# Patient Record
Sex: MALE | Race: WHITE | NOT HISPANIC OR LATINO | Employment: FULL TIME | URBAN - METROPOLITAN AREA
[De-identification: names, ages, dates, MRNs, and addresses within clinical notes are randomized per-mention and may not be internally consistent; named-entity substitution may affect disease eponyms.]

---

## 2017-03-01 ENCOUNTER — ALLSCRIPTS OFFICE VISIT (OUTPATIENT)
Dept: OTHER | Facility: OTHER | Age: 57
End: 2017-03-01

## 2017-03-01 DIAGNOSIS — D69.6 THROMBOCYTOPENIA (HCC): ICD-10-CM

## 2017-03-01 DIAGNOSIS — F17.200 NICOTINE DEPENDENCE, UNCOMPLICATED: ICD-10-CM

## 2017-03-01 DIAGNOSIS — I10 ESSENTIAL (PRIMARY) HYPERTENSION: ICD-10-CM

## 2017-03-01 DIAGNOSIS — K21.9 GASTRO-ESOPHAGEAL REFLUX DISEASE WITHOUT ESOPHAGITIS: ICD-10-CM

## 2017-05-24 ENCOUNTER — ALLSCRIPTS OFFICE VISIT (OUTPATIENT)
Dept: OTHER | Facility: OTHER | Age: 57
End: 2017-05-24

## 2017-07-19 ENCOUNTER — ALLSCRIPTS OFFICE VISIT (OUTPATIENT)
Dept: OTHER | Facility: OTHER | Age: 57
End: 2017-07-19

## 2017-07-19 ENCOUNTER — APPOINTMENT (OUTPATIENT)
Dept: LAB | Facility: HOSPITAL | Age: 57
End: 2017-07-19
Attending: INTERNAL MEDICINE
Payer: COMMERCIAL

## 2017-07-19 DIAGNOSIS — E55.9 VITAMIN D DEFICIENCY: ICD-10-CM

## 2017-07-19 DIAGNOSIS — I10 ESSENTIAL (PRIMARY) HYPERTENSION: ICD-10-CM

## 2017-07-19 DIAGNOSIS — E87.5 HYPERKALEMIA: ICD-10-CM

## 2017-07-19 DIAGNOSIS — K21.9 GASTRO-ESOPHAGEAL REFLUX DISEASE WITHOUT ESOPHAGITIS: ICD-10-CM

## 2017-07-19 DIAGNOSIS — D69.6 THROMBOCYTOPENIA (HCC): ICD-10-CM

## 2017-07-19 LAB
ANION GAP SERPL CALCULATED.3IONS-SCNC: 12 MMOL/L (ref 4–13)
BUN SERPL-MCNC: 28 MG/DL (ref 5–25)
CALCIUM SERPL-MCNC: 9.7 MG/DL (ref 8.3–10.1)
CHLORIDE SERPL-SCNC: 97 MMOL/L (ref 100–108)
CO2 SERPL-SCNC: 23 MMOL/L (ref 21–32)
CREAT SERPL-MCNC: 1.51 MG/DL (ref 0.6–1.3)
GFR SERPL CREATININE-BSD FRML MDRD: 48 ML/MIN/1.73SQ M
GLUCOSE SERPL-MCNC: 102 MG/DL (ref 65–140)
POTASSIUM SERPL-SCNC: 5.5 MMOL/L (ref 3.5–5.3)
SODIUM SERPL-SCNC: 132 MMOL/L (ref 136–145)

## 2017-07-19 PROCEDURE — 80048 BASIC METABOLIC PNL TOTAL CA: CPT

## 2017-07-19 PROCEDURE — 36415 COLL VENOUS BLD VENIPUNCTURE: CPT

## 2017-08-22 ENCOUNTER — APPOINTMENT (OUTPATIENT)
Dept: LAB | Facility: HOSPITAL | Age: 57
End: 2017-08-22
Payer: COMMERCIAL

## 2017-08-22 DIAGNOSIS — E87.5 HYPERKALEMIA: ICD-10-CM

## 2017-08-22 LAB
ANION GAP SERPL CALCULATED.3IONS-SCNC: 10 MMOL/L (ref 4–13)
BUN SERPL-MCNC: 13 MG/DL (ref 5–25)
CALCIUM SERPL-MCNC: 9.5 MG/DL (ref 8.3–10.1)
CHLORIDE SERPL-SCNC: 105 MMOL/L (ref 100–108)
CO2 SERPL-SCNC: 24 MMOL/L (ref 21–32)
CREAT SERPL-MCNC: 0.87 MG/DL (ref 0.6–1.3)
GFR SERPL CREATININE-BSD FRML MDRD: 96 ML/MIN/1.73SQ M
GLUCOSE P FAST SERPL-MCNC: 101 MG/DL (ref 65–99)
MAGNESIUM SERPL-MCNC: 2.4 MG/DL (ref 1.6–2.6)
POTASSIUM SERPL-SCNC: 4.8 MMOL/L (ref 3.5–5.3)
SODIUM SERPL-SCNC: 139 MMOL/L (ref 136–145)

## 2017-08-22 PROCEDURE — 83735 ASSAY OF MAGNESIUM: CPT

## 2017-08-22 PROCEDURE — 36415 COLL VENOUS BLD VENIPUNCTURE: CPT

## 2017-08-22 PROCEDURE — 80048 BASIC METABOLIC PNL TOTAL CA: CPT

## 2017-09-18 ENCOUNTER — ALLSCRIPTS OFFICE VISIT (OUTPATIENT)
Dept: OTHER | Facility: OTHER | Age: 57
End: 2017-09-18

## 2017-09-18 DIAGNOSIS — E55.9 VITAMIN D DEFICIENCY: ICD-10-CM

## 2017-09-18 DIAGNOSIS — K21.9 GASTRO-ESOPHAGEAL REFLUX DISEASE WITHOUT ESOPHAGITIS: ICD-10-CM

## 2017-09-18 DIAGNOSIS — I10 ESSENTIAL (PRIMARY) HYPERTENSION: ICD-10-CM

## 2017-09-18 DIAGNOSIS — R91.1 SOLITARY PULMONARY NODULE: ICD-10-CM

## 2017-12-18 ENCOUNTER — ALLSCRIPTS OFFICE VISIT (OUTPATIENT)
Dept: OTHER | Facility: OTHER | Age: 57
End: 2017-12-18

## 2017-12-19 NOTE — PROGRESS NOTES
Assessment  1  Benign essential HTN (401 1) (I10)   2  Vitamin D deficiency (268 9) (E55 9)   3  Thrombocytopenia (287 5) (D69 6)    Plan  Benign essential HTN, Thrombocytopenia    · Follow-up visit in 3 months Evaluation and Treatment  Follow-up  Status: Hold For - Scheduling Requested for: 67GJL6741  PMH: Screen for colon cancer    · COLONOSCOPY; Status:Active; Requested for:80Rwn7260;   SocHx: Current every day smoker, Pulmonary nodule    · Complete PFT; Status:Active; Requested for:80Mbw8878;   Vitamin D deficiency    · Vitamin D (Ergocalciferol) 20101 UNIT Oral Capsule    Discussion/Summary  Discussion Summary:   See med list again advise for blood test    Counseling Documentation With Imm: The patient was counseled regarding instructions for management,-- risk factor reductions,-- prognosis,-- impressions  Chief Complaint  Chief Complaint Free Text Note Form: pt  presents for follow up for HTN  pt  did not have BW done  History of Present Illness  Hypertension (Follow-Up): The patient presents for follow-up of essential hypertension  The patient states he has been doing poorly with his blood pressure control since the last visit  Comorbid Illnesses: nephropathy  Symptoms: The patient is currently asymptomatic  Associated symptoms include no focal neurologic deficits-- and-- no memory loss  Home monitoring: The patient is not checking blood pressure at home  Blood pressure control has been poor  Medications: the patient is not adherent with his medication regimen  -- He denies medication side effects  The patient is due for a lipid panel-- and-- a serum creatinine  Review of Systems  Complete-Male:  Constitutional: No fever or chills, feels well, no tiredness, no recent weight gain or weight loss  Eyes: No complaints of eye pain, no red eyes, no discharge from eyes, no itchy eyes    ENT: no complaints of earache, no hearing loss, no nosebleeds, no nasal discharge, no sore throat, no hoarseness  Cardiovascular: No complaints of slow heart rate, no fast heart rate, no chest pain, no palpitations, no leg claudication, no lower extremity  Respiratory: cough  Gastrointestinal: No complaints of abdominal pain, no constipation, no nausea or vomiting, no diarrhea or bloody stools  Musculoskeletal: joint stiffness, but-- no arthralgias  Neurological: No compliants of headache, no confusion, no convulsions, no numbness or tingling, no dizziness or fainting, no limb weakness, no difficulty walking  Psychiatric: anxiety, but-- not suicidal-- and-- no sleep disturbances  Endocrine: No complaints of proptosis, no hot flashes, no muscle weakness, no erectile dysfunction, no deepening of the voice, no feelings of weakness  Hematologic/Lymphatic: No complaints of swollen glands, no swollen glands in the neck, does not bleed easily, no easy bruising  Active Problems  1  Benign essential HTN (401 1) (I10)   2  Current every day smoker (305 1) (F17 200)   3  Gastroesophageal reflux disease (530 81) (K21 9)   4  Hyperkalemia (276 7) (E87 5)   5  Iliotibial band syndrome of left side (728 89) (M76 32)   6  Pulmonary nodule (793 11) (R91 1)   7  Screening for depression (V79 0) (Z13 89)   8  Thrombocytopenia (287 5) (D69 6)   9  Vitamin D deficiency (268 9) (E55 9)    Past Medical History  1  History of Abnormal chest xray (793 2) (R93 8)   2  History of Acute pain of left knee (719 46) (M25 562)   3  History of Aftercare following joint replacement (V54 81) (Z47 1)   4  History of Atopic dermatitis, unspecified type (691 8) (L20 9)   5  History of mixed hyperlipidemia (V12 29) (Z86 39)   6  History of Multiple fractures (829 0) (T07  XXXA)   7  History of Need for influenza vaccination (V04 81) (Z23)   8  History of Need for vaccination with 13-polyvalent pneumococcal conjugate vaccine (V03 82) (Z23)   9  History of Screen for colon cancer (V76 51) (Z12 11)   10   History of Screening cholesterol level (V77 91) (Z13 220)   11  History of Screening PSA (prostate specific antigen) (V76 44) (Z12 5)  Active Problems And Past Medical History Reviewed: The active problems and past medical history were reviewed and updated today  Surgical History  1  History of Tonsillectomy   2  History of Total Hip Replacement  Surgical History Reviewed: The surgical history was reviewed and updated today  Family History  Mother    1  No pertinent family history  Father    2  Family history of Hypertension  Family History Reviewed: The family history was reviewed and updated today  Social History   · Current every day smoker (305 1) (F17 200)   · Current every day smoker (305 1) (F17 200)   · Denied: History of No drug use   · Social alcohol use  Social History Reviewed: The social history was reviewed and updated today  Current Meds   1  AmLODIPine Besylate 10 MG Oral Tablet; TAKE 1 TABLET DAILY  Requested for: 63FYX3025; Last Rx:51Upa1926 Ordered   2  Folic Acid 1 MG Oral Tablet; TAKE 1 TABLET DAILY  Requested for: 70XPZ5041; Last Rx:12Xhs1682 Ordered   3  Metoprolol Tartrate 100 MG Oral Tablet; TAKE 1 TABLET TWICE DAILY  Requested for: 53Kwt8773; Last Rx:67Xwe7749 Ordered   4  Vitamin D (Ergocalciferol) 45832 UNIT Oral Capsule; TAKE 1 CAPSULE WEEKLY; Therapy: 28NBB6122 to (Last Rx:76Qow4780)  Requested for: 51YWO0321 Ordered   5  Vitamin D CAPS; TAKE 1 CAPSULE ONCE DAILY; Therapy: (Recorded:38Flj6373) to Recorded  Medication List Reviewed: The medication list was reviewed and updated today  Allergies  1  lisinopril  2  Other   3   Seasonal    Vitals  Vital Signs    Recorded: 33FTH7202 04:24PM Recorded: 79OXM8046 04:10PM   Temperature  96 6 F, Tympanic   Heart Rate  71   Systolic 149 073, LUE, Sitting   Diastolic 76 82, LUE, Sitting   Height  6 ft 2 in   Weight  159 lb 4 oz   BMI Calculated  20 45   BSA Calculated  1 97   O2 Saturation  98, RA     Physical Exam   Eyes  Conjunctiva and lids: No swelling, erythema, or discharge  Ears, Nose, Mouth, and Throat  Otoscopic examination: Tympanic membrance translucent with normal light reflex  Canals patent without erythema  Oropharynx: Normal with no erythema, edema, exudate or lesions  Pulmonary  Auscultation of lungs: Abnormal   Auscultation of the lungs revealed decreased breath sounds diffusely  Cardiovascular  Palpation of heart: Normal PMI, no thrills  Auscultation of heart: Normal rate and rhythm, normal S1 and S2, without murmurs  Examination of extremities for edema and/or varicosities: Normal    Abdomen  Abdomen: Non-tender, no masses  Liver and spleen: No hepatomegaly or splenomegaly  Musculoskeletal  Gait and station: Normal    Digits and nails: Normal without clubbing or cyanosis  Skin dry  Neurologic  Cranial nerves: Cranial nerves 2-12 intact  Reflexes: 2+ and symmetric  Psychiatric  Orientation to person, place and time: Normal    Mood and affect: Normal          Health Management  History of Screen for colon cancer   COLONOSCOPY; every 10 years; Last 24DKA4826; Next Due: 13Mjz0014; Overdue  Screening for depression   *VB-Depression Screening; every 1 year; Last 86HUS5705; Next Due: 25Qbg8586; Active    Signatures   Electronically signed by :  Milind Yanez MD; Dec 18 2017  4:29PM EST                       (Author)

## 2018-01-11 NOTE — MISCELLANEOUS
Message  Pt was admitted 8/24/16 to  [facility] and discharged 8/25/16  LMOM for pt to call office to schedule GIDEON  Unable to contact pt  Will schedule hospital follow up if pt calls back  Hospital documents obtained  TCMPR  Active Problems    1  Abnormal chest xray (793 2) (R93 8)   2  Atopic dermatitis, unspecified type (691 8) (L20 9)   3  Benign essential HTN (401 1) (I10)   4  Current every day smoker (305 1) (F17 200)   5  Gastroesophageal reflux disease (530 81) (K21 9)   6  Iliotibial band syndrome of left side (728 89) (M76 32)   7  Multiple fractures (829 0) (T14 8)   8  Pulmonary nodule (793 11) (R91 1)   9  Screen for colon cancer (V76 51) (Z12 11)   10  Thrombocytopenia (287 5) (D69 6)    Current Meds   1  AmLODIPine Besylate 10 MG Oral Tablet; TAKE 1 TABLET DAILY  Requested for:   66CLN8981; Last Rx:25Mar2016 Ordered   2  Claritin 10 MG Oral Tablet; take 1 tablet by mouth once daily; Therapy: 24WAW0234 to Recorded   3  Folic Acid 1 MG Oral Tablet; TAKE 1 TABLET DAILY  Requested for: 65VMH2051; Last   Rx:02Mar2016 Ordered   4  Lisinopril 10 MG Oral Tablet; TAKE 1 TABLET DAILY  Requested for: 04MXD6386; Last   Rx:02Mar2016 Ordered   5  Metoprolol Tartrate 50 MG Oral Tablet; TAKE 1 TABLET TWICE DAILY  Requested for:   64GKR2896; Last Rx:02Mar2016 Ordered    Allergies    1  No Known Drug Allergies    2  No Known Food Allergies   3   Seasonal    Signatures   Electronically signed by : Pia Hauser, ; Aug 30 2016 10:46AM EST                       (Author)

## 2018-01-12 NOTE — RESULT NOTES
Verified Results  CT CHEST W CONTRAST [id number] 08:30AM [facility] Order Number: [id number]     Test Name Result Flag Reference   CT CHEST W CONTRAST (Report)     CT CHEST WITH IV CONTRAST     INDICATION: Follow up pulmonary nodules     COMPARISON: 11/23/2014     TECHNIQUE: CT examination of the chest was performed  85 mL of Omnipaque 350 was injected intravenously  Axial, sagittal and coronal reformatted images were submitted for interpretation  Coronal thick section MIP (maximal intensity projection) images    were also created  This examination, like all CT scans performed in the [facility], was performed utilizing techniques to minimize radiation dose exposure, including the use of iterative reconstruction and automated exposure control  FINDINGS:     LUNGS: There are minimal changes of centrilobular emphysema in the upper lobes  The 4 mm right middle lobe nodule is not well visualized though appears stable on coronal MIP image 26  Additional small nodules are seen on MIP images, not well    visualized previously due to respiratory motion though they appear stable  These include a 2 mm nodule peripherally in the right lower lobe on image 33, a 2 mm left costophrenic angle nodule on image 33  A right upper lobe nodule measuring 3 mm on    image 26 is stable  PLEURA: Unremarkable  HEART/GREAT VESSELS: Unremarkable for patient's age  MEDIASTINUM AND MEÑO: Unremarkable  CHEST WALL AND LOWER NECK: Unremarkable  VISUALIZED STRUCTURES IN THE UPPER ABDOMEN: There is mild hepatic steatosis  OSSEOUS STRUCTURES: No acute fracture  No destructive osseous lesion  IMPRESSION:     1  Stable small lung nodules  2  Mild hepatic steatosis         Workstation performed: [id number]     Signed by:   José Miguel Castro MD   3/22/16

## 2018-01-13 VITALS
SYSTOLIC BLOOD PRESSURE: 142 MMHG | DIASTOLIC BLOOD PRESSURE: 68 MMHG | TEMPERATURE: 98.6 F | HEART RATE: 55 BPM | HEIGHT: 74 IN | BODY MASS INDEX: 21.21 KG/M2 | OXYGEN SATURATION: 98 % | WEIGHT: 165.25 LBS

## 2018-01-14 VITALS
HEART RATE: 99 BPM | TEMPERATURE: 98.1 F | WEIGHT: 162.25 LBS | OXYGEN SATURATION: 98 % | BODY MASS INDEX: 20.82 KG/M2 | HEIGHT: 74 IN | DIASTOLIC BLOOD PRESSURE: 84 MMHG | SYSTOLIC BLOOD PRESSURE: 168 MMHG

## 2018-01-14 VITALS
TEMPERATURE: 97.4 F | WEIGHT: 162.5 LBS | OXYGEN SATURATION: 98 % | BODY MASS INDEX: 20.86 KG/M2 | HEIGHT: 74 IN | HEART RATE: 88 BPM | DIASTOLIC BLOOD PRESSURE: 88 MMHG | SYSTOLIC BLOOD PRESSURE: 162 MMHG

## 2018-01-17 NOTE — MISCELLANEOUS
Message  Pt was admitted to [facility] on 8/25/16 and discharged on 8/31/16 with delirium tremens  REceived task about discharged on 9/5/16  Too late for GIDEON  Pt has pending appt with Dr Zackery Kocher today, 9/6/16, at 215PM  Discussed with Dr Zackery Kocher Dr Zackery Kocher will address at today's [facility]  Hospital documents obtained  Active Problems    1  Abnormal chest xray (793 2) (R93 8)   2  Atopic dermatitis, unspecified type (691 8) (L20 9)   3  Benign essential HTN (401 1) (I10)   4  Current every day smoker (305 1) (F17 200)   5  Gastroesophageal reflux disease (530 81) (K21 9)   6  Iliotibial band syndrome of left side (728 89) (M76 32)   7  Multiple fractures (829 0) (T14 8)   8  Pulmonary nodule (793 11) (R91 1)   9  Screen for colon cancer (V76 51) (Z12 11)   10  Thrombocytopenia (287 5) (D69 6)    Current Meds   1  AmLODIPine Besylate 10 MG Oral Tablet; TAKE 1 TABLET DAILY  Requested for:   09TZH0553; Last Rx:25Mar2016 Ordered   2  Claritin 10 MG Oral Tablet; take 1 tablet by mouth once daily; Therapy: 84DZX2150 to Recorded   3  Folic Acid 1 MG Oral Tablet; TAKE 1 TABLET DAILY  Requested for: 30CMK4944; Last   Rx:02Mar2016 Ordered   4  Lisinopril 10 MG Oral Tablet; TAKE 1 TABLET DAILY  Requested for: 33ADV1159; Last   Rx:02Mar2016 Ordered   5  Metoprolol Tartrate 50 MG Oral Tablet; TAKE 1 TABLET TWICE DAILY  Requested for:   91DPN0088; Last Rx:02Mar2016 Ordered    Allergies    1  No Known Drug Allergies    2  No Known Food Allergies   3   Seasonal    Signatures   Electronically signed by : Fernanda Sutton, ; Sep  6 2016 12:27PM EST                       (Author)

## 2018-01-17 NOTE — MISCELLANEOUS
Provider Comments  Provider Comments:   PT NO CALL NO SHOW FOR APPT 5/24/2017      Signatures   Electronically signed by :  Joshua Pearsno MD; May 24 2017  3:10PM EST                       (Author)

## 2018-01-18 NOTE — MISCELLANEOUS
Message  Pt was admitted to [facility] on 8/19/16 and discharged on 8/24/16 with ambulatory dysfunction and right hip pain  Pt discharged to [facility]  Hospital docs obtained  No GIDEON needed at this time  TCMNV      Active Problems    1  Abnormal chest xray (793 2) (R93 8)   2  Atopic dermatitis, unspecified type (691 8) (L20 9)   3  Benign essential HTN (401 1) (I10)   4  Current every day smoker (305 1) (F17 200)   5  Gastroesophageal reflux disease (530 81) (K21 9)   6  Iliotibial band syndrome of left side (728 89) (M76 32)   7  Multiple fractures (829 0) (T14 8)   8  Pulmonary nodule (793 11) (R91 1)   9  Screen for colon cancer (V76 51) (Z12 11)   10  Thrombocytopenia (287 5) (D69 6)    Current Meds   1  AmLODIPine Besylate 10 MG Oral Tablet; TAKE 1 TABLET DAILY  Requested for:   74YHP3320; Last Rx:25Mar2016 Ordered   2  Claritin 10 MG Oral Tablet; take 1 tablet by mouth once daily; Therapy: 08RFZ4783 to Recorded   3  Folic Acid 1 MG Oral Tablet; TAKE 1 TABLET DAILY  Requested for: 09HLJ0882; Last   Rx:02Mar2016 Ordered   4  Lisinopril 10 MG Oral Tablet; TAKE 1 TABLET DAILY  Requested for: 14SXQ6068; Last   Rx:02Mar2016 Ordered   5  Metoprolol Tartrate 50 MG Oral Tablet; TAKE 1 TABLET TWICE DAILY  Requested for:   76TLF4897; Last Rx:02Mar2016 Ordered    Allergies    1  No Known Drug Allergies    2  No Known Food Allergies   3   Seasonal    Signatures   Electronically signed by : Donna Beckham, ; Aug 26 2016  3:00PM EST                       (Author)

## 2018-01-23 VITALS
WEIGHT: 159.25 LBS | TEMPERATURE: 96.6 F | HEIGHT: 74 IN | OXYGEN SATURATION: 98 % | SYSTOLIC BLOOD PRESSURE: 144 MMHG | BODY MASS INDEX: 20.44 KG/M2 | DIASTOLIC BLOOD PRESSURE: 76 MMHG | HEART RATE: 71 BPM

## 2018-05-07 ENCOUNTER — OFFICE VISIT (OUTPATIENT)
Dept: INTERNAL MEDICINE CLINIC | Age: 58
End: 2018-05-07
Payer: COMMERCIAL

## 2018-05-07 VITALS
DIASTOLIC BLOOD PRESSURE: 82 MMHG | OXYGEN SATURATION: 98 % | HEART RATE: 114 BPM | SYSTOLIC BLOOD PRESSURE: 152 MMHG | WEIGHT: 158.2 LBS | TEMPERATURE: 98.2 F | BODY MASS INDEX: 20.31 KG/M2

## 2018-05-07 DIAGNOSIS — I10 ESSENTIAL HYPERTENSION: Primary | ICD-10-CM

## 2018-05-07 DIAGNOSIS — G89.29 CHRONIC PAIN OF BOTH KNEES: ICD-10-CM

## 2018-05-07 DIAGNOSIS — M25.562 CHRONIC PAIN OF BOTH KNEES: ICD-10-CM

## 2018-05-07 DIAGNOSIS — M25.561 CHRONIC PAIN OF BOTH KNEES: ICD-10-CM

## 2018-05-07 PROCEDURE — 99214 OFFICE O/P EST MOD 30 MIN: CPT | Performed by: INTERNAL MEDICINE

## 2018-05-07 RX ORDER — AMLODIPINE BESYLATE 10 MG/1
10 TABLET ORAL DAILY
Qty: 90 TABLET | Refills: 1 | Status: SHIPPED | OUTPATIENT
Start: 2018-05-07 | End: 2018-11-05 | Stop reason: SDUPTHER

## 2018-05-07 RX ORDER — COVID-19 ANTIGEN TEST
2 KIT MISCELLANEOUS DAILY
COMMUNITY

## 2018-05-07 RX ORDER — FOLIC ACID 1 MG/1
1 TABLET ORAL DAILY
Qty: 90 TABLET | Refills: 1 | Status: SHIPPED | OUTPATIENT
Start: 2018-05-07 | End: 2018-11-05 | Stop reason: SDUPTHER

## 2018-05-07 RX ORDER — METOPROLOL TARTRATE 50 MG/1
50 TABLET, FILM COATED ORAL 2 TIMES DAILY
Qty: 90 TABLET | Refills: 1 | Status: SHIPPED | OUTPATIENT
Start: 2018-05-07 | End: 2018-11-05 | Stop reason: SDUPTHER

## 2018-05-07 RX ORDER — AMLODIPINE BESYLATE 10 MG/1
1 TABLET ORAL DAILY
COMMUNITY
End: 2018-05-07 | Stop reason: SDUPTHER

## 2018-05-07 NOTE — PROGRESS NOTES
Assessment/Plan:    1  Bilateral knee pain probably secondary to severe DJD   plan   will get x-ray of both knees  Advised to take over-the-counter Aleve 2 tablets twice a day with food  Refer to orthopedic surgeon for further management and recommendation     2  Hypertension   will continue with the present regimen of lisinopril 10 mg daily amlodipine 10 mg daily  Advised to do blood test before next visit  Diagnoses and all orders for this visit:    Essential hypertension  -     amLODIPine (NORVASC) 10 mg tablet; Take 1 tablet (10 mg total) by mouth daily  -     metoprolol tartrate (LOPRESSOR) 50 mg tablet; Take 1 tablet (50 mg total) by mouth 2 (two) times a day  -     folic acid (FOLVITE) 1 mg tablet; Take 1 tablet (1 mg total) by mouth daily    Chronic pain of both knees  -     Ambulatory referral to Orthopedic Surgery; Future  -     XR knee 3 vw left non injury; Future  -     XR knee 3 vw right non injury; Future    Other orders  -     Discontinue: amLODIPine (NORVASC) 10 mg tablet; Take 1 tablet by mouth daily  -     CALCIUM-VITAMIN D PO; Take 1 tablet by mouth daily  -     Naproxen Sodium (ALEVE) 220 MG CAPS; Take 2 capsules by mouth daily          Subjective:          Patient ID: Torrie Beltran is a 62 y o  male  Knee Pain    The incident occurred more than 1 week ago  There was no injury mechanism  The pain is present in the right knee and left knee  The pain is at a severity of 6/10  The pain is severe  The pain has been worsening since onset  Pertinent negatives include no inability to bear weight or muscle weakness  The symptoms are aggravated by movement  He has tried NSAIDs for the symptoms  The treatment provided mild relief         The following portions of the patient's history were reviewed and updated as appropriate: allergies, current medications, past family history, past medical history, past social history, past surgical history and problem list     Review of Systems   Constitutional: Negative for fatigue and fever  HENT: Negative for congestion, ear discharge, ear pain, postnasal drip, sinus pressure, sore throat, tinnitus and trouble swallowing  Eyes: Negative for discharge, itching and visual disturbance  Respiratory: Negative for cough and shortness of breath  Cardiovascular: Negative for chest pain and palpitations  Gastrointestinal: Negative for abdominal pain, diarrhea, nausea and vomiting  Endocrine: Negative for cold intolerance and polyuria  Genitourinary: Negative for difficulty urinating, dysuria and urgency  Musculoskeletal: Positive for arthralgias  Negative for neck pain  Skin: Negative for rash  Allergic/Immunologic: Negative for environmental allergies  Neurological: Negative for dizziness, weakness and headaches  Psychiatric/Behavioral: Negative for agitation  The patient is not nervous/anxious  Past Medical History:   Diagnosis Date    Foot fracture, right     Hypertension     Lung nodule     pt doesn't know details    Rib fracture     per patient report    Skin disorder     pt doesn't know name of skin disorder         Current Outpatient Prescriptions:     amLODIPine (NORVASC) 10 mg tablet, Take 1 tablet (10 mg total) by mouth daily, Disp: 90 tablet, Rfl: 1    CALCIUM-VITAMIN D PO, Take 1 tablet by mouth daily, Disp: , Rfl:     folic acid (FOLVITE) 1 mg tablet, Take 1 tablet (1 mg total) by mouth daily, Disp: 90 tablet, Rfl: 1    metoprolol tartrate (LOPRESSOR) 50 mg tablet, Take 1 tablet (50 mg total) by mouth 2 (two) times a day, Disp: 90 tablet, Rfl: 1    Naproxen Sodium (ALEVE) 220 MG CAPS, Take 2 capsules by mouth daily, Disp: , Rfl:     acetaminophen (TYLENOL) 325 mg tablet, Take 2 tablets (650 mg total) by mouth every 6 (six) hours as needed for mild pain, moderate pain or fever  , Disp: 30 tablet, Rfl: 0    lisinopril (ZESTRIL) 10 mg tablet, Take 10 mg by mouth daily Indications: unsure of dose , Disp: , Rfl:    predniSONE 10 mg tablet, Take by mouth: 4 tabs daily for 4 days, then 3 tabs daily for 4 days, then 2 tabs daily for 4 days, then 1 tab daily for 4 days, Disp: 40 tablet, Rfl: 0    traMADol (ULTRAM) 50 mg tablet, Take 1-3 tabs by mouth every 4 hours as needed for pain, Disp: 30 tablet, Rfl: 0    Allergies   Allergen Reactions    Lisinopril      Other reaction(s): Back pain, kidney damage    Morphine And Related     Other      Annotation - [id number]: Narcotics    Oxycontin [Oxycodone]     Pollen Extract        Social History   Past Surgical History:   Procedure Laterality Date    FINGER SURGERY Left     ring finger - pin put in    TOTAL HIP ARTHROPLASTY Left      Family History   Problem Relation Age of Onset    Cancer Mother     Hypertension Father        Objective:  /82 (BP Location: Left arm, Patient Position: Sitting, Cuff Size: Standard)   Pulse (!) 114   Temp 98 2 °F (36 8 °C) (Tympanic)   Wt 71 8 kg (158 lb 3 2 oz)   SpO2 98%   BMI 20 31 kg/m²   Body mass index is 20 31 kg/m²  Physical Exam   Constitutional: He appears well-developed  HENT:   Head: Normocephalic  Right Ear: External ear normal    Left Ear: External ear normal    Mouth/Throat: Oropharynx is clear and moist    Eyes: Pupils are equal, round, and reactive to light  No scleral icterus  Neck: Normal range of motion  Neck supple  No tracheal deviation present  No thyromegaly present  Cardiovascular: Normal rate, regular rhythm and normal heart sounds  No murmur heard  Pulmonary/Chest: Effort normal and breath sounds normal  No respiratory distress  He exhibits no tenderness  Abdominal: Soft  Bowel sounds are normal  He exhibits no mass  There is no tenderness  Musculoskeletal: Normal range of motion  He exhibits no edema  Lymphadenopathy:     He has no cervical adenopathy  Neurological: He is alert  No cranial nerve deficit  Skin: Skin is warm and dry  Psychiatric: He has a normal mood and affect

## 2018-05-14 ENCOUNTER — OFFICE VISIT (OUTPATIENT)
Dept: INTERNAL MEDICINE CLINIC | Age: 58
End: 2018-05-14
Payer: COMMERCIAL

## 2018-05-14 VITALS
WEIGHT: 161.8 LBS | DIASTOLIC BLOOD PRESSURE: 104 MMHG | BODY MASS INDEX: 20.77 KG/M2 | OXYGEN SATURATION: 98 % | SYSTOLIC BLOOD PRESSURE: 162 MMHG | TEMPERATURE: 97.7 F | HEART RATE: 82 BPM

## 2018-05-14 DIAGNOSIS — M25.561 CHRONIC PAIN OF BOTH KNEES: ICD-10-CM

## 2018-05-14 DIAGNOSIS — M25.562 CHRONIC PAIN OF BOTH KNEES: ICD-10-CM

## 2018-05-14 DIAGNOSIS — G89.29 CHRONIC PAIN OF BOTH KNEES: ICD-10-CM

## 2018-05-14 DIAGNOSIS — I10 ESSENTIAL HYPERTENSION: Primary | ICD-10-CM

## 2018-05-14 PROCEDURE — 99214 OFFICE O/P EST MOD 30 MIN: CPT | Performed by: INTERNAL MEDICINE

## 2018-05-14 RX ORDER — MAG HYDROX/ALUMINUM HYD/SIMETH 400-400-40
1 SUSPENSION, ORAL (FINAL DOSE FORM) ORAL DAILY
COMMUNITY

## 2018-05-14 NOTE — PROGRESS NOTES
Assessment/Plan:      1  Bilateral knee pain   last week will refer the patient to orthopedic surgeon and also requested x-ray both knee  Again advised him to make the appointment as soon as possible  And I will fill his FMLA form for this chronic condition     2  Essential hypertension   repeat blood pressure is 160/92  He did not take this morning dose so far   Advise him to take his antihypertensive medicine as soon as possible     3  Chronic hearing loss   again advise for hearing evaluation and possible hearing aid     Diagnoses and all orders for this visit:    Essential hypertension    Chronic pain of both knees    Other orders  -     Cholecalciferol (VITAMIN D3) 5000 units CAPS; Take 1 capsule by mouth daily          Subjective:          Patient ID: Ana Navarro is a 62 y o  male  Patient is here to fill up the home from [facility] and Medical leave at work place  He does complain of knee pain off and on  And was referred to orthopedic surgeon on previous visit still he is in the process of making the appointment  The following portions of the patient's history were reviewed and updated as appropriate: allergies, current medications, past family history, past medical history, past social history, past surgical history and problem list     Review of Systems   Constitutional: Negative for fatigue and fever  HENT: Positive for hearing loss  Negative for congestion, ear discharge, ear pain, postnasal drip, sinus pressure, sore throat, tinnitus and trouble swallowing  Eyes: Negative for discharge, itching and visual disturbance  Respiratory: Negative for cough and shortness of breath  Cardiovascular: Negative for chest pain and palpitations  Gastrointestinal: Negative for abdominal pain, diarrhea, nausea and vomiting  Endocrine: Negative for cold intolerance and polyuria  Genitourinary: Negative for difficulty urinating, dysuria and urgency     Musculoskeletal: Positive for arthralgias and back pain  Negative for neck pain  Skin: Negative for rash  Allergic/Immunologic: Negative for environmental allergies  Neurological: Negative for dizziness, weakness and headaches  Psychiatric/Behavioral: The patient is not nervous/anxious  Past Medical History:   Diagnosis Date    Foot fracture, right     Hypertension     Lung nodule     pt doesn't know details    Rib fracture     per patient report    Skin disorder     pt doesn't know name of skin disorder         Current Outpatient Prescriptions:     acetaminophen (TYLENOL) 325 mg tablet, Take 2 tablets (650 mg total) by mouth every 6 (six) hours as needed for mild pain, moderate pain or fever  , Disp: 30 tablet, Rfl: 0    amLODIPine (NORVASC) 10 mg tablet, Take 1 tablet (10 mg total) by mouth daily, Disp: 90 tablet, Rfl: 1    CALCIUM-VITAMIN D PO, Take 1 tablet by mouth daily, Disp: , Rfl:     folic acid (FOLVITE) 1 mg tablet, Take 1 tablet (1 mg total) by mouth daily, Disp: 90 tablet, Rfl: 1    metoprolol tartrate (LOPRESSOR) 50 mg tablet, Take 1 tablet (50 mg total) by mouth 2 (two) times a day, Disp: 90 tablet, Rfl: 1    Naproxen Sodium (ALEVE) 220 MG CAPS, Take 2 capsules by mouth daily, Disp: , Rfl:     predniSONE 10 mg tablet, Take by mouth: 4 tabs daily for 4 days, then 3 tabs daily for 4 days, then 2 tabs daily for 4 days, then 1 tab daily for 4 days, Disp: 40 tablet, Rfl: 0    Cholecalciferol (VITAMIN D3) 5000 units CAPS, Take 1 capsule by mouth daily, Disp: , Rfl:     Allergies   Allergen Reactions    Buprenorphine     Lisinopril      Other reaction(s): Back pain, kidney damage    Morphine And Related     Other      Annotation - [id number]: Narcotics    Oxycontin [Oxycodone]     Pollen Extract     Tramadol Hallucinations     Encephalopathy/agitation       Social History   Past Surgical History:   Procedure Laterality Date    FINGER SURGERY Left     ring finger - pin put in    TOTAL HIP ARTHROPLASTY Left Family History   Problem Relation Age of Onset    Cancer Mother     Hypertension Father        Objective:  BP (!) 162/104 (BP Location: Left arm, Patient Position: Sitting, Cuff Size: Standard)   Pulse 82   Temp 97 7 °F (36 5 °C) (Tympanic)   Wt 73 4 kg (161 lb 12 8 oz)   SpO2 98%   BMI 20 77 kg/m²   Body mass index is 20 77 kg/m²  Physical Exam   Constitutional: He appears well-developed  HENT:   Head: Normocephalic  Right Ear: External ear normal    Left Ear: External ear normal    Mouth/Throat: Oropharynx is clear and moist    Eyes: Pupils are equal, round, and reactive to light  No scleral icterus  Neck: Normal range of motion  Neck supple  No tracheal deviation present  No thyromegaly present  Cardiovascular: Normal rate, regular rhythm and normal heart sounds  Pulmonary/Chest: Effort normal and breath sounds normal  No respiratory distress  He exhibits no tenderness  Abdominal: Soft  Bowel sounds are normal  He exhibits no mass  There is no tenderness  Musculoskeletal: Normal range of motion  He exhibits tenderness  He exhibits no edema or deformity  Mild tenderness over lower lumbar area noted  Lymphadenopathy:     He has no cervical adenopathy  Neurological: He is alert  He displays normal reflexes  No cranial nerve deficit  He exhibits normal muscle tone  Coordination normal    Skin: Skin is warm  Psychiatric: He has a normal mood and affect

## 2018-06-13 ENCOUNTER — OFFICE VISIT (OUTPATIENT)
Dept: INTERNAL MEDICINE CLINIC | Facility: CLINIC | Age: 58
End: 2018-06-13
Payer: COMMERCIAL

## 2018-06-13 VITALS
HEIGHT: 74 IN | RESPIRATION RATE: 20 BRPM | DIASTOLIC BLOOD PRESSURE: 78 MMHG | TEMPERATURE: 98.6 F | WEIGHT: 161.6 LBS | OXYGEN SATURATION: 98 % | BODY MASS INDEX: 20.74 KG/M2 | HEART RATE: 82 BPM | SYSTOLIC BLOOD PRESSURE: 148 MMHG

## 2018-06-13 DIAGNOSIS — G89.29 CHRONIC PAIN OF BOTH KNEES: Primary | ICD-10-CM

## 2018-06-13 DIAGNOSIS — M25.562 CHRONIC PAIN OF BOTH KNEES: Primary | ICD-10-CM

## 2018-06-13 DIAGNOSIS — M25.561 CHRONIC PAIN OF BOTH KNEES: Primary | ICD-10-CM

## 2018-06-13 PROCEDURE — 99213 OFFICE O/P EST LOW 20 MIN: CPT | Performed by: NURSE PRACTITIONER

## 2018-06-13 NOTE — PROGRESS NOTES
Assessment/Plan:    Chronic pain of both knees  Advised patient to follow-up with Dr Marianne Pino, and set up an appointment with Dr Jasmyn Greene  Patient is to use ice or heat for pain and continue to take aleve for discomfort  FMLA paperwork filled out at patients office visit today  Diagnoses and all orders for this visit:    Chronic pain of both knees          Subjective:      Patient ID: Gerardo Arana is a 62 y o  male  Patient presents today to get FMLA paperwork filled out  He was seen on 5/14/18 by Dr Marianne Pino for chronic bilateral knee pain  Patient was referred to orthopedic surgeon and was given a script for an x-ray for bilateral knees  Patient recently has been "put out of work" due to his knee and back pain  He has had knee pain since 2015 and was diagnosed with serve arthritis of bilateral lower extremities  Patient BP was elevated at last visit to our office at 162/104 patient had stated he did not take his BP medication that day  Will check his BP today to see if any BP medication adjustments need to be made  BP stable  Knee Pain    Pertinent negatives include no numbness  The following portions of the patient's history were reviewed and updated as appropriate: allergies, current medications, past family history, past medical history, past social history, past surgical history and problem list     Review of Systems   Constitutional: Negative for activity change, appetite change, chills, diaphoresis and fever  HENT: Negative for congestion, ear discharge, ear pain, postnasal drip, rhinorrhea, sinus pain, sinus pressure and sore throat  Eyes: Negative for pain, discharge, itching and visual disturbance  Respiratory: Negative for cough, chest tightness, shortness of breath and wheezing  Cardiovascular: Negative for chest pain, palpitations and leg swelling  Gastrointestinal: Negative for abdominal pain, constipation, diarrhea, nausea and vomiting     Endocrine: Negative for polydipsia, polyphagia and polyuria  Genitourinary: Negative for difficulty urinating, dysuria and urgency  Musculoskeletal: Negative for arthralgias and neck pain  Bilateral knee pain   Skin: Negative for rash and wound  Neurological: Negative for dizziness, weakness, numbness and headaches           Past Medical History:   Diagnosis Date    Foot fracture, right     Hypertension     Lung nodule     pt doesn't know details    Rib fracture     per patient report    Skin disorder     pt doesn't know name of skin disorder         Current Outpatient Prescriptions:     amLODIPine (NORVASC) 10 mg tablet, Take 1 tablet (10 mg total) by mouth daily, Disp: 90 tablet, Rfl: 1    CALCIUM-VITAMIN D PO, Take 1 tablet by mouth daily, Disp: , Rfl:     Cholecalciferol (VITAMIN D3) 5000 units CAPS, Take 1 capsule by mouth daily, Disp: , Rfl:     folic acid (FOLVITE) 1 mg tablet, Take 1 tablet (1 mg total) by mouth daily, Disp: 90 tablet, Rfl: 1    metoprolol tartrate (LOPRESSOR) 50 mg tablet, Take 1 tablet (50 mg total) by mouth 2 (two) times a day, Disp: 90 tablet, Rfl: 1    Naproxen Sodium (ALEVE) 220 MG CAPS, Take 2 capsules by mouth daily, Disp: , Rfl:     Allergies   Allergen Reactions    Buprenorphine     Lisinopril      Other reaction(s): Back pain, kidney damage    Morphine And Related     Other      Annotation - [id number]: Narcotics    Oxycontin [Oxycodone]     Pollen Extract     Tramadol Hallucinations     Encephalopathy/agitation       Social History   Past Surgical History:   Procedure Laterality Date    FINGER SURGERY Left     ring finger - pin put in    TOTAL HIP ARTHROPLASTY Left      Family History   Problem Relation Age of Onset    Cancer Mother     Hypertension Father        Objective:  /78 (BP Location: Left arm, Patient Position: Sitting, Cuff Size: Adult)   Pulse 82   Temp 98 6 °F (37 °C) (Oral)   Resp 20   Ht 6' 2" (1 88 m) Comment: with shoes on  Wt 73 3 kg (161 lb 9 6 oz) Comment: with shoes on  SpO2 98% Comment: room air  BMI 20 75 kg/m²     No results found for this or any previous visit (from the past 1344 hour(s))  Physical Exam   Constitutional: He is oriented to person, place, and time  He appears well-developed and well-nourished  No distress  HENT:   Head: Normocephalic and atraumatic  Right Ear: External ear normal    Left Ear: External ear normal    Nose: Nose normal    Mouth/Throat: Oropharynx is clear and moist  No oropharyngeal exudate  Eyes: Conjunctivae and EOM are normal  Pupils are equal, round, and reactive to light  Right eye exhibits no discharge  Left eye exhibits no discharge  Neck: Normal range of motion  Neck supple  No thyromegaly present  Cardiovascular: Normal rate, regular rhythm, normal heart sounds and intact distal pulses  Exam reveals no gallop and no friction rub  No murmur heard  Pulmonary/Chest: Effort normal and breath sounds normal  No stridor  No respiratory distress  He has no wheezes  He has no rales  Abdominal: Soft  Bowel sounds are normal  He exhibits no distension  There is no tenderness  Musculoskeletal:        Right knee: He exhibits bony tenderness  Left knee: He exhibits bony tenderness  Lumbar back: He exhibits bony tenderness (mild)  Lymphadenopathy:     He has no cervical adenopathy  Neurological: He is alert and oriented to person, place, and time  Skin: Skin is warm and dry  No rash noted  He is not diaphoretic  No erythema  Psychiatric: He has a normal mood and affect   His behavior is normal  Judgment and thought content normal

## 2018-07-24 ENCOUNTER — OFFICE VISIT (OUTPATIENT)
Dept: OBGYN CLINIC | Facility: HOSPITAL | Age: 58
End: 2018-07-24
Payer: COMMERCIAL

## 2018-07-24 ENCOUNTER — HOSPITAL ENCOUNTER (OUTPATIENT)
Dept: RADIOLOGY | Facility: HOSPITAL | Age: 58
Discharge: HOME | End: 2018-07-24
Attending: ORTHOPAEDIC SURGERY
Payer: COMMERCIAL

## 2018-07-24 VITALS
BODY MASS INDEX: 20.66 KG/M2 | DIASTOLIC BLOOD PRESSURE: 75 MMHG | SYSTOLIC BLOOD PRESSURE: 131 MMHG | WEIGHT: 161 LBS | HEIGHT: 74 IN | HEART RATE: 73 BPM

## 2018-07-24 DIAGNOSIS — M25.562 CHRONIC PAIN OF BOTH KNEES: ICD-10-CM

## 2018-07-24 DIAGNOSIS — M25.561 CHRONIC PAIN OF BOTH KNEES: ICD-10-CM

## 2018-07-24 DIAGNOSIS — G89.29 CHRONIC PAIN OF BOTH KNEES: Primary | ICD-10-CM

## 2018-07-24 DIAGNOSIS — M25.561 CHRONIC PAIN OF BOTH KNEES: Primary | ICD-10-CM

## 2018-07-24 DIAGNOSIS — M25.562 CHRONIC PAIN OF BOTH KNEES: Primary | ICD-10-CM

## 2018-07-24 DIAGNOSIS — G89.29 CHRONIC PAIN OF BOTH KNEES: ICD-10-CM

## 2018-07-24 DIAGNOSIS — M23.92 ACUTE INTERNAL DERANGEMENT OF KNEE, LEFT: ICD-10-CM

## 2018-07-24 DIAGNOSIS — M23.91 INTERNAL DERANGEMENT OF RIGHT KNEE: ICD-10-CM

## 2018-07-24 PROCEDURE — 99203 OFFICE O/P NEW LOW 30 MIN: CPT | Performed by: ORTHOPAEDIC SURGERY

## 2018-07-24 PROCEDURE — 73565 X-RAY EXAM OF KNEES: CPT

## 2018-07-24 NOTE — PROGRESS NOTES
62 y o male who is known to the practice and has been seen previously by us  It has been sometime since this gentleman came to the office for an appointment  [place] and recently informs me he was placed on disability status  He reports continued or chronic knee pain describing no single knee more advanced her more severe than the other  His major functional impairment is going up and down incline planes or stairs  In review he has had essentially minimal if no treatment  He has had 1 steroid injection to the left knee 2 years ago  He has used television advertised knee wraps with some benefit  He also had a spray that you apply topical to the knees that last for approximately 10 min  Most recently he had x-rays of both knees at [facility] which he says shows decline in his knees  Those x-rays were available for review today  His intention was to be seen by his attending surgeon of record diagnosis and treatment options      Past Medical History:   Diagnosis Date    Foot fracture, right     Hypertension     Lung nodule     pt doesn't know details    Rib fracture     per patient report    Skin disorder     pt doesn't know name of skin disorder       Past Surgical History:   Procedure Laterality Date    FINGER SURGERY Left     ring finger - pin put in    TOTAL HIP ARTHROPLASTY Left        Family History   Problem Relation Age of Onset    Cancer Mother     Hypertension Father        Social History   Substance Use Topics    Smoking status: Current Every Day Smoker     Packs/day: 2 00    Smokeless tobacco: Never Used    Alcohol use No         Review of Systems  Review of systems negative unless otherwise specified in HPI    Past Medical History  Past Medical History:   Diagnosis Date    Foot fracture, right     Hypertension     Lung nodule     pt doesn't know details    Rib fracture     per patient report    Skin disorder     pt doesn't know name of skin disorder Past Surgical History  Past Surgical History:   Procedure Laterality Date    FINGER SURGERY Left     ring finger - pin put in    TOTAL HIP ARTHROPLASTY Left        Current Medications  Current Outpatient Prescriptions on File Prior to Visit   Medication Sig Dispense Refill    amLODIPine (NORVASC) 10 mg tablet Take 1 tablet (10 mg total) by mouth daily 90 tablet 1    CALCIUM-VITAMIN D PO Take 1 tablet by mouth daily      Cholecalciferol (VITAMIN D3) 5000 units CAPS Take 1 capsule by mouth daily      folic acid (FOLVITE) 1 mg tablet Take 1 tablet (1 mg total) by mouth daily 90 tablet 1    metoprolol tartrate (LOPRESSOR) 50 mg tablet Take 1 tablet (50 mg total) by mouth 2 (two) times a day 90 tablet 1    Naproxen Sodium (ALEVE) 220 MG CAPS Take 2 capsules by mouth daily       No current facility-administered medications on file prior to visit  Recent Labs [facility])    0  Lab Value Date/Time   HCT 38 6 08/24/2016 0532   HCT 32 9 (L) 12/11/2014 0633   HGB 13 4 08/24/2016 0532   HGB 10 9 (L) 12/11/2014 0633   WBC 8 21 08/24/2016 0532   WBC 6 89 12/11/2014 0633   INR 0 98 08/24/2016 0532   INR 1 06 11/26/2014 0601   GLUCOSE 102 07/19/2017 1323   GLUCOSE 106 12/11/2014 3037         Physical exam  · General: Awake, Alert, Oriented, there is the apparent acrid odor of tobacco the permeates the room  · Eyes: Pupils equal, round and reactive to light  · Heart: regular rate and rhythm  · Lungs: No audible wheezing  · Abdomen: soft  Musculoskeletal;  Both lower extremities have dry scaly skin patches that the patient relates are a past history of eczema    These are not Mosaic or limited,  they and cover the entire lower extremity from thigh down to the foot and ankle  His legs are thin and there circumference but show adequate quadriceps and patellar tendon silhouette and function  He is able to extend both knees there is no effusion of either knee  I am unable to elicit reproducible pain of the medial or lateral joint spaces of either knee  Imaging  X-rays of both [address] May 30th of this year,  Personally reviewed by myself and the attending show minimal degenerative changes age-appropriate nature he continues to have preserved medial joint line space both knees there is the presence of radiographic changes to the patellofemoral joint  Procedure  N/A    Assessment/Plan:   62 y  o male patient with bilateral knee pain, yet very minimal to modest arthritic changes  Given pain atypical for and out of proportion to his x-rays an MRI is indicated  MRI beginning with the left knee is ordered on the patient's behalf  He will be seen in follow-up after his been performed and interpreted  This concluded his visit today was my privilege to assist the attending surgeon in its delivery

## 2018-08-07 ENCOUNTER — OFFICE VISIT (OUTPATIENT)
Dept: INTERNAL MEDICINE CLINIC | Age: 58
End: 2018-08-07
Payer: COMMERCIAL

## 2018-08-07 VITALS
TEMPERATURE: 97.8 F | WEIGHT: 169.8 LBS | OXYGEN SATURATION: 97 % | HEART RATE: 80 BPM | SYSTOLIC BLOOD PRESSURE: 140 MMHG | DIASTOLIC BLOOD PRESSURE: 82 MMHG | BODY MASS INDEX: 21.8 KG/M2

## 2018-08-07 DIAGNOSIS — M25.561 CHRONIC PAIN OF BOTH KNEES: ICD-10-CM

## 2018-08-07 DIAGNOSIS — G89.29 CHRONIC PAIN OF BOTH KNEES: ICD-10-CM

## 2018-08-07 DIAGNOSIS — L30.9 DERMATITIS: ICD-10-CM

## 2018-08-07 DIAGNOSIS — H90.71 MIXED CONDUCTIVE AND SENSORINEURAL HEARING LOSS OF RIGHT EAR, UNSPECIFIED HEARING STATUS ON CONTRALATERAL SIDE: ICD-10-CM

## 2018-08-07 DIAGNOSIS — I10 ESSENTIAL HYPERTENSION WITH GOAL BLOOD PRESSURE LESS THAN 140/90: Primary | ICD-10-CM

## 2018-08-07 DIAGNOSIS — M25.562 CHRONIC PAIN OF BOTH KNEES: ICD-10-CM

## 2018-08-07 PROCEDURE — 99214 OFFICE O/P EST MOD 30 MIN: CPT | Performed by: INTERNAL MEDICINE

## 2018-08-07 NOTE — PROGRESS NOTES
Assessment/Plan:    1  Essential hypertension   blood pressure is acceptable  Will continue present dose of amlodipine and metoprolol  Again advised to quit smoking  Continue with low-salt diet     2  Bilateral knee pain   patient was evaluated by orthopedic surgeon  They requested MR PAOLA  And patient will be followed by them     3  Hearing loss   will refer patient to ENT before hearing test and possible hearing aid evaluation     4  Chronic dermatitis   chronic condition patient was seen by a dermatologist many times  No significant improvement except when he moved to [facility]  Diagnoses and all orders for this visit:    Essential hypertension with goal blood pressure less than 140/90    Chronic pain of both knees    Mixed conductive and sensorineural hearing loss of right ear, unspecified hearing status on contralateral side  -     Ambulatory Referral to Otolaryngology; Future    Dermatitis  -     Ambulatory referral to Dermatology; Future          Subjective:          Patient ID: Livingston Seip is a 62 y o  male  Hypertension   This is a chronic problem  The current episode started more than 1 year ago  The problem has been waxing and waning since onset  Pertinent negatives include no anxiety, chest pain, headaches, malaise/fatigue, neck pain, palpitations, peripheral edema or shortness of breath  There are no associated agents to hypertension  Past treatments include beta blockers and calcium channel blockers  The current treatment provides moderate improvement  There is no history of angina or PVD  There is no history of chronic renal disease  The following portions of the patient's history were reviewed and updated as appropriate: allergies, current medications, past family history, past medical history, past social history, past surgical history and problem list     Review of Systems   Constitutional: Negative for fatigue, fever and malaise/fatigue     HENT: Negative for congestion, ear discharge, ear pain, postnasal drip, sinus pressure, sore throat, tinnitus and trouble swallowing  Eyes: Negative for discharge, itching and visual disturbance  Respiratory: Negative for cough and shortness of breath  Cardiovascular: Negative for chest pain and palpitations  Gastrointestinal: Negative for abdominal pain, diarrhea, nausea and vomiting  Endocrine: Negative for cold intolerance and polyuria  Genitourinary: Negative for difficulty urinating, dysuria and urgency  Musculoskeletal: Positive for arthralgias  Negative for neck pain  Skin: Negative for rash  Allergic/Immunologic: Negative for environmental allergies  Neurological: Negative for dizziness, weakness and headaches  Psychiatric/Behavioral: Negative  The patient is not nervous/anxious            Past Medical History:   Diagnosis Date    Foot fracture, right     Hypertension     Lung nodule     pt doesn't know details    Rib fracture     per patient report    Skin disorder     pt doesn't know name of skin disorder         Current Outpatient Prescriptions:     amLODIPine (NORVASC) 10 mg tablet, Take 1 tablet (10 mg total) by mouth daily, Disp: 90 tablet, Rfl: 1    CALCIUM-VITAMIN D PO, Take 1 tablet by mouth daily, Disp: , Rfl:     Cholecalciferol (VITAMIN D3) 5000 units CAPS, Take 1 capsule by mouth daily, Disp: , Rfl:     folic acid (FOLVITE) 1 mg tablet, Take 1 tablet (1 mg total) by mouth daily, Disp: 90 tablet, Rfl: 1    metoprolol tartrate (LOPRESSOR) 50 mg tablet, Take 1 tablet (50 mg total) by mouth 2 (two) times a day, Disp: 90 tablet, Rfl: 1    Naproxen Sodium (ALEVE) 220 MG CAPS, Take 2 capsules by mouth daily, Disp: , Rfl:     Allergies   Allergen Reactions    Buprenorphine     Lisinopril      Other reaction(s): Back pain, kidney damage    Morphine And Related     Other      Annotation - [id number]: Narcotics    Oxycontin [Oxycodone]     Pollen Extract     Tramadol Hallucinations and Other (See Comments)     Encephalopathy/agitation  Encephalopathy/agitation       Social History   Past Surgical History:   Procedure Laterality Date    FINGER SURGERY Left     ring finger - pin put in    TOTAL HIP ARTHROPLASTY Left      Family History   Problem Relation Age of Onset    Cancer Mother     Hypertension Father        Objective:  /82 (BP Location: Left arm, Patient Position: Sitting, Cuff Size: Standard)   Pulse 80   Temp 97 8 °F (36 6 °C) (Tympanic)   Wt 77 kg (169 lb 12 8 oz)   SpO2 97%   BMI 21 80 kg/m²   Body mass index is 21 8 kg/m²  Physical Exam   Constitutional: He appears well-developed  HENT:   Head: Normocephalic  Mouth/Throat: Oropharynx is clear and moist    Eyes: Pupils are equal, round, and reactive to light  No scleral icterus  Neck: Normal range of motion  Neck supple  No tracheal deviation present  No thyromegaly present  Cardiovascular: Normal rate, regular rhythm and normal heart sounds  Pulmonary/Chest: Effort normal and breath sounds normal  No respiratory distress  He exhibits no tenderness  Abdominal: Soft  Bowel sounds are normal  He exhibits no mass  There is no tenderness  Musculoskeletal: Normal range of motion  Lymphadenopathy:     He has no cervical adenopathy  Neurological: He is alert  No cranial nerve deficit  Skin: Skin is warm and dry  Psychiatric: He has a normal mood and affect

## 2018-08-13 ENCOUNTER — HOSPITAL ENCOUNTER (OUTPATIENT)
Dept: MRI IMAGING | Facility: HOSPITAL | Age: 58
Discharge: HOME | End: 2018-08-13
Payer: COMMERCIAL

## 2018-08-13 DIAGNOSIS — M25.561 CHRONIC PAIN OF BOTH KNEES: ICD-10-CM

## 2018-08-13 DIAGNOSIS — G89.29 CHRONIC PAIN OF BOTH KNEES: ICD-10-CM

## 2018-08-13 DIAGNOSIS — M25.562 CHRONIC PAIN OF BOTH KNEES: ICD-10-CM

## 2018-08-13 PROCEDURE — 73721 MRI JNT OF LWR EXTRE W/O DYE: CPT

## 2018-10-25 ENCOUNTER — TELEPHONE (OUTPATIENT)
Dept: INTERNAL MEDICINE CLINIC | Age: 58
End: 2018-10-25

## 2018-10-25 NOTE — TELEPHONE ENCOUNTER
Received a request for medical records from [employer] on this patient    I faxed the request over today to [facility] to process the request

## 2018-11-05 ENCOUNTER — OFFICE VISIT (OUTPATIENT)
Dept: INTERNAL MEDICINE CLINIC | Facility: CLINIC | Age: 58
End: 2018-11-05
Payer: COMMERCIAL

## 2018-11-05 VITALS
OXYGEN SATURATION: 98 % | SYSTOLIC BLOOD PRESSURE: 148 MMHG | WEIGHT: 168.6 LBS | BODY MASS INDEX: 22.35 KG/M2 | HEART RATE: 112 BPM | HEIGHT: 73 IN | DIASTOLIC BLOOD PRESSURE: 86 MMHG | TEMPERATURE: 98.1 F

## 2018-11-05 DIAGNOSIS — E78.2 MIXED HYPERLIPIDEMIA: ICD-10-CM

## 2018-11-05 DIAGNOSIS — I10 ESSENTIAL HYPERTENSION: ICD-10-CM

## 2018-11-05 DIAGNOSIS — Z23 NEED FOR INFLUENZA VACCINATION: ICD-10-CM

## 2018-11-05 DIAGNOSIS — L30.9 DERMATITIS: Primary | ICD-10-CM

## 2018-11-05 PROCEDURE — 90686 IIV4 VACC NO PRSV 0.5 ML IM: CPT

## 2018-11-05 PROCEDURE — 90471 IMMUNIZATION ADMIN: CPT

## 2018-11-05 PROCEDURE — 99214 OFFICE O/P EST MOD 30 MIN: CPT | Performed by: INTERNAL MEDICINE

## 2018-11-05 RX ORDER — METOPROLOL TARTRATE 50 MG/1
50 TABLET, FILM COATED ORAL 2 TIMES DAILY
Qty: 90 TABLET | Refills: 1 | Status: SHIPPED | OUTPATIENT
Start: 2018-11-05

## 2018-11-05 RX ORDER — AMLODIPINE BESYLATE 10 MG/1
10 TABLET ORAL DAILY
Qty: 90 TABLET | Refills: 1 | Status: SHIPPED | OUTPATIENT
Start: 2018-11-05

## 2018-11-05 RX ORDER — FOLIC ACID 1 MG/1
1 TABLET ORAL DAILY
Qty: 90 TABLET | Refills: 1 | Status: SHIPPED | OUTPATIENT
Start: 2018-11-05

## 2018-11-05 NOTE — PROGRESS NOTES
Assessment/Plan:    1  Uncontrolled essential hypertension  Blood pressure is still little bit on the higher side secondary to noncompliance to meds  He was out of his metoprolol at least for 2 weeks  Advised to take a medicine on regular basis  2  Hyperlipidemia  Will check lipid profile before next visit  Advised to take at least 2 fish oil tablets daily  And also advised to quit smoking  3  Bilateral knee pain  Advised to follow with the orthopedic surgeon Dr Rico White will as soon as possible    4  Chronic tobacco abuse  Again advised to quit smoking  But presently in carlos a as it   Diagnoses and all orders for this visit:    Dermatitis    Essential hypertension  -     metoprolol tartrate (LOPRESSOR) 50 mg tablet; Take 1 tablet (50 mg total) by mouth 2 (two) times a day  -     amLODIPine (NORVASC) 10 mg tablet; Take 1 tablet (10 mg total) by mouth daily  -     folic acid (FOLVITE) 1 mg tablet; Take 1 tablet (1 mg total) by mouth daily  -     CBC; Future  -     Basic metabolic panel; Future  -     Lipid Panel with Direct LDL reflex; Future    Mixed hyperlipidemia  -     Lipid Panel with Direct LDL reflex; Future          Subjective:          Patient ID: Adia Turcios is a 62 y o  male  Hypertension   This is a chronic problem  The current episode started more than 1 year ago  The problem has been waxing and waning since onset  Pertinent negatives include no anxiety, chest pain, headaches, malaise/fatigue, neck pain, palpitations, peripheral edema or shortness of breath  There are no associated agents to hypertension  Risk factors for coronary artery disease include dyslipidemia and smoking/tobacco exposure  Past treatments include calcium channel blockers and beta blockers  Compliance problems include exercise  There is no history of angina or CVA  There is no history of chronic renal disease         The following portions of the patient's history were reviewed and updated as appropriate: allergies, current medications, past family history, past medical history, past social history, past surgical history and problem list     Review of Systems   Constitutional: Negative for fatigue, fever and malaise/fatigue  HENT: Negative for congestion, ear discharge, ear pain, postnasal drip, sinus pressure, sore throat, tinnitus and trouble swallowing  Eyes: Negative for discharge, itching and visual disturbance  Respiratory: Negative for cough and shortness of breath  Cardiovascular: Negative for chest pain and palpitations  Gastrointestinal: Negative for abdominal pain, diarrhea, nausea and vomiting  Endocrine: Negative for cold intolerance and polyuria  Genitourinary: Negative for difficulty urinating, dysuria and urgency  Musculoskeletal: Positive for arthralgias  Negative for neck pain  Skin: Negative for rash  Allergic/Immunologic: Negative for environmental allergies  Neurological: Negative for dizziness, weakness and headaches  Psychiatric/Behavioral: The patient is not nervous/anxious            Past Medical History:   Diagnosis Date    Foot fracture, right     Hypertension     Lung nodule     pt doesn't know details    Rib fracture     per patient report    Skin disorder     pt doesn't know name of skin disorder         Current Outpatient Prescriptions:     amLODIPine (NORVASC) 10 mg tablet, Take 1 tablet (10 mg total) by mouth daily, Disp: 90 tablet, Rfl: 1    CALCIUM-VITAMIN D PO, Take 1 tablet by mouth daily, Disp: , Rfl:     Cholecalciferol (VITAMIN D3) 5000 units CAPS, Take 1 capsule by mouth daily, Disp: , Rfl:     folic acid (FOLVITE) 1 mg tablet, Take 1 tablet (1 mg total) by mouth daily, Disp: 90 tablet, Rfl: 1    metoprolol tartrate (LOPRESSOR) 50 mg tablet, Take 1 tablet (50 mg total) by mouth 2 (two) times a day, Disp: 90 tablet, Rfl: 1    Naproxen Sodium (ALEVE) 220 MG CAPS, Take 2 capsules by mouth daily, Disp: , Rfl:     Allergies   Allergen Reactions    Buprenorphine  Lisinopril      Other reaction(s): Back pain, kidney damage    Morphine And Related     Other      Annotation - [id number]: Narcotics    Oxycontin [Oxycodone]     Pollen Extract     Tramadol Hallucinations and Other (See Comments)     Encephalopathy/agitation  Encephalopathy/agitation       Social History   Past Surgical History:   Procedure Laterality Date    FINGER SURGERY Left     ring finger - pin put in    TOTAL HIP ARTHROPLASTY Left      Family History   Problem Relation Age of Onset    Cancer Mother     Hypertension Father        Objective:  /86 (BP Location: Right arm, Patient Position: Sitting, Cuff Size: Large)   Pulse (!) 112   Temp 98 1 °F (36 7 °C) (Oral)   Ht 6' 1" (1 854 m)   Wt 76 5 kg (168 lb 9 6 oz)   SpO2 98% Comment: room air  BMI 22 24 kg/m²   Body mass index is 22 24 kg/m²  Physical Exam   Constitutional: He appears well-developed  HENT:   Head: Normocephalic  Right Ear: External ear normal    Left Ear: External ear normal    Mouth/Throat: Oropharynx is clear and moist    Eyes: Pupils are equal, round, and reactive to light  No scleral icterus  Neck: Normal range of motion  Neck supple  No tracheal deviation present  No thyromegaly present  Cardiovascular: Normal rate, regular rhythm and normal heart sounds  Pulmonary/Chest: Effort normal and breath sounds normal  No respiratory distress  He exhibits no tenderness  Abdominal: Soft  Bowel sounds are normal  He exhibits no mass  There is no tenderness  Musculoskeletal: Normal range of motion  He exhibits no edema  Lymphadenopathy:     He has no cervical adenopathy  Neurological: He is alert  No cranial nerve deficit  Skin: Skin is warm and dry  Psychiatric: He has a normal mood and affect

## 2018-11-27 ENCOUNTER — OFFICE VISIT (OUTPATIENT)
Dept: OBGYN CLINIC | Facility: HOSPITAL | Age: 58
End: 2018-11-27
Payer: COMMERCIAL

## 2018-11-27 VITALS
BODY MASS INDEX: 22.58 KG/M2 | SYSTOLIC BLOOD PRESSURE: 143 MMHG | DIASTOLIC BLOOD PRESSURE: 76 MMHG | HEIGHT: 73 IN | HEART RATE: 85 BPM | WEIGHT: 170.4 LBS

## 2018-11-27 DIAGNOSIS — M25.562 CHRONIC PAIN OF BOTH KNEES: Primary | ICD-10-CM

## 2018-11-27 DIAGNOSIS — G89.29 CHRONIC PAIN OF BOTH KNEES: Primary | ICD-10-CM

## 2018-11-27 DIAGNOSIS — M25.561 CHRONIC PAIN OF BOTH KNEES: Primary | ICD-10-CM

## 2018-11-27 PROCEDURE — 20610 DRAIN/INJ JOINT/BURSA W/O US: CPT | Performed by: ORTHOPAEDIC SURGERY

## 2018-11-27 PROCEDURE — 99213 OFFICE O/P EST LOW 20 MIN: CPT | Performed by: ORTHOPAEDIC SURGERY

## 2018-11-27 RX ORDER — BETAMETHASONE SODIUM PHOSPHATE AND BETAMETHASONE ACETATE 3; 3 MG/ML; MG/ML
6 INJECTION, SUSPENSION INTRA-ARTICULAR; INTRALESIONAL; INTRAMUSCULAR; SOFT TISSUE
Status: COMPLETED | OUTPATIENT
Start: 2018-11-27 | End: 2018-11-27

## 2018-11-27 RX ORDER — LIDOCAINE HYDROCHLORIDE 20 MG/ML
2 INJECTION, SOLUTION EPIDURAL; INFILTRATION; INTRACAUDAL; PERINEURAL
Status: COMPLETED | OUTPATIENT
Start: 2018-11-27 | End: 2018-11-27

## 2018-11-27 RX ORDER — BUPIVACAINE HYDROCHLORIDE 2.5 MG/ML
2 INJECTION, SOLUTION INFILTRATION; PERINEURAL
Status: COMPLETED | OUTPATIENT
Start: 2018-11-27 | End: 2018-11-27

## 2018-11-27 RX ADMIN — LIDOCAINE HYDROCHLORIDE 2 ML: 20 INJECTION, SOLUTION EPIDURAL; INFILTRATION; INTRACAUDAL; PERINEURAL at 09:33

## 2018-11-27 RX ADMIN — BETAMETHASONE SODIUM PHOSPHATE AND BETAMETHASONE ACETATE 6 MG: 3; 3 INJECTION, SUSPENSION INTRA-ARTICULAR; INTRALESIONAL; INTRAMUSCULAR; SOFT TISSUE at 09:32

## 2018-11-27 RX ADMIN — BUPIVACAINE HYDROCHLORIDE 2 ML: 2.5 INJECTION, SOLUTION INFILTRATION; PERINEURAL at 09:33

## 2018-11-27 RX ADMIN — BUPIVACAINE HYDROCHLORIDE 2 ML: 2.5 INJECTION, SOLUTION INFILTRATION; PERINEURAL at 09:32

## 2018-11-27 RX ADMIN — LIDOCAINE HYDROCHLORIDE 2 ML: 20 INJECTION, SOLUTION EPIDURAL; INFILTRATION; INTRACAUDAL; PERINEURAL at 09:32

## 2018-11-27 RX ADMIN — BETAMETHASONE SODIUM PHOSPHATE AND BETAMETHASONE ACETATE 6 MG: 3; 3 INJECTION, SUSPENSION INTRA-ARTICULAR; INTRALESIONAL; INTRAMUSCULAR; SOFT TISSUE at 09:33

## 2018-11-27 NOTE — PROGRESS NOTES
62 y o male presents for care of bilateral knee pain  At his previous visit he was ordered an MRI given the degree of his pain with minimal x-ray findings  He is currently on disability  He describes the pain is primarily anteriorly based which is minimally improved with oral anti-inflammatory usage  He had 1 corticosteroid injection to his left knee in the distant past which did not provide significant relief  He denies motor sensory deficits  He has not had any physical therapy for his knees  Review of Systems  Review of systems negative unless otherwise specified in HPI    Past Medical History  Past Medical History:   Diagnosis Date    Foot fracture, right     Hypertension     Lung nodule     pt doesn't know details    Rib fracture     per patient report    Skin disorder     pt doesn't know name of skin disorder       Past Surgical History  Past Surgical History:   Procedure Laterality Date    FINGER SURGERY Left     ring finger - pin put in    TOTAL HIP ARTHROPLASTY Left        Current Medications  Current Outpatient Prescriptions on File Prior to Visit   Medication Sig Dispense Refill    amLODIPine (NORVASC) 10 mg tablet Take 1 tablet (10 mg total) by mouth daily 90 tablet 1    CALCIUM-VITAMIN D PO Take 1 tablet by mouth daily      Cholecalciferol (VITAMIN D3) 5000 units CAPS Take 1 capsule by mouth daily      folic acid (FOLVITE) 1 mg tablet Take 1 tablet (1 mg total) by mouth daily 90 tablet 1    metoprolol tartrate (LOPRESSOR) 50 mg tablet Take 1 tablet (50 mg total) by mouth 2 (two) times a day 90 tablet 1    Naproxen Sodium (ALEVE) 220 MG CAPS Take 2 capsules by mouth daily       No current facility-administered medications on file prior to visit          Recent Labs [facility])    0  Lab Value Date/Time   HCT 38 6 08/24/2016 0532   HCT 32 9 (L) 12/11/2014 0633   HGB 13 4 08/24/2016 0532   HGB 10 9 (L) 12/11/2014 0633   WBC 8 21 08/24/2016 0532   WBC 6 89 12/11/2014 0633   INR 0 98 08/24/2016 0532   INR 1 06 11/26/2014 0601   GLUCOSE 106 12/11/2014 3711         Physical exam  General: Awake, Alert, Oriented  HEENT: No scleral injection, no evidence of facial trauma  Heart: Extremities warm and well perfused  Lungs: No audible wheezing  ·   bilateral Knee exam  · No effusions of either knee  · Active knee range of motion 0-110 degrees without pain  · Mild crepitus heard during knee range of motion but this did not elicit pain  · No joint line tenderness  · Negative Jaime's exam bilaterally  · Bilateral lower extremities appear well perfused  · Minimal muscle mass to quadriceps and hamstring to palpation    Procedure  Large joint arthrocentesis  Date/Time: 11/27/2018 9:32 AM  Consent given by: patient  Supporting Documentation  Indications: pain   Procedure Details  Location: knee - R knee  Needle size: 22 G  Approach: anterolateral  Medications administered: 2 mL bupivacaine 0 25 %; 2 mL lidocaine (PF) 2 %; 6 mg betamethasone acetate-betamethasone sodium phosphate 6 (3-3) mg/mL    Dressing:  Sterile dressing applied  Large joint arthrocentesis  Date/Time: 11/27/2018 9:33 AM  Consent given by: patient  Supporting Documentation  Indications: pain   Procedure Details  Location: knee - L knee  Needle size: 22 G  Approach: anterolateral  Medications administered: 2 mL bupivacaine 0 25 %; 2 mL lidocaine (PF) 2 %; 6 mg betamethasone acetate-betamethasone sodium phosphate 6 (3-3) mg/mL    Dressing:  Sterile dressing applied          Imaging  MRI of left knee unremarkable    Assessment plan:  49-year-old male with bilateral knee pain consistent likely with deconditioning with the element of mild osteoarthritis  Plan:  Weightbear as tolerated bilateral lower extremities  May continue oral analgesics as needed for pain  S/p bilateral corticosteroid injections today, tolerated well  Follow-up 3 months for re-evaluation of pain and consideration for repeat injections if necessary    Otilio Pin  11/27/18

## 2019-11-21 ENCOUNTER — TELEPHONE (OUTPATIENT)
Dept: INTERNAL MEDICINE CLINIC | Age: 59
End: 2019-11-21

## 2019-11-21 NOTE — TELEPHONE ENCOUNTER
----- Message from Cora Boxer sent at 11/21/2019  2:19 PM EST -----  Regarding: Appointment   Call to schedule follow up

## 2020-02-27 ENCOUNTER — TELEPHONE (OUTPATIENT)
Dept: INTERNAL MEDICINE CLINIC | Age: 60
End: 2020-02-27

## 2020-02-27 NOTE — TELEPHONE ENCOUNTER
Received a request from [email or web address] on behalf of [address] in request for medical records     They also sent us a form to fill out for this patient but I sent back to them that he hasn't seen us since 2018 and will not fill out the form    Sent to [employer] the request over for them to process this request

## 2020-07-17 ENCOUNTER — TELEPHONE (OUTPATIENT)
Dept: INTERNAL MEDICINE CLINIC | Age: 60
End: 2020-07-17

## 2020-07-17 NOTE — TELEPHONE ENCOUNTER
LMOM for pt  To call backto verify PCP  If Dr Connor Nash is PCP please schedule over due follow up with Dr Connor Nash

## 2023-03-07 NOTE — ASSESSMENT & PLAN NOTE
Advised patient to follow-up with Dr Jeff San, and set up an appointment with Dr Hortensia Nguyễn  Patient is to use ice or heat for pain and continue to take aleve for discomfort  FMLA paperwork filled out at patients office visit today 
(V5) oriented